# Patient Record
Sex: FEMALE | Race: WHITE | NOT HISPANIC OR LATINO
[De-identification: names, ages, dates, MRNs, and addresses within clinical notes are randomized per-mention and may not be internally consistent; named-entity substitution may affect disease eponyms.]

---

## 2018-12-21 ENCOUNTER — CHART COPY (OUTPATIENT)
Age: 63
End: 2018-12-21

## 2018-12-21 RX ORDER — AMOXICILLIN 500 MG/1
500 CAPSULE ORAL
Qty: 20 | Refills: 2 | Status: ACTIVE | COMMUNITY
Start: 2018-12-21 | End: 1900-01-01

## 2022-08-27 ENCOUNTER — APPOINTMENT (OUTPATIENT)
Dept: ORTHOPEDIC SURGERY | Facility: CLINIC | Age: 67
End: 2022-08-27

## 2022-08-27 VITALS — HEIGHT: 60 IN | WEIGHT: 106 LBS | BODY MASS INDEX: 20.81 KG/M2

## 2022-08-27 DIAGNOSIS — S59.901A UNSPECIFIED INJURY OF RIGHT ELBOW, INITIAL ENCOUNTER: ICD-10-CM

## 2022-08-27 PROCEDURE — A4565: CPT

## 2022-08-27 PROCEDURE — 73110 X-RAY EXAM OF WRIST: CPT | Mod: RT

## 2022-08-27 PROCEDURE — 99203 OFFICE O/P NEW LOW 30 MIN: CPT

## 2022-08-27 PROCEDURE — 73070 X-RAY EXAM OF ELBOW: CPT | Mod: RT

## 2022-08-28 PROBLEM — S59.901A ELBOW INJURY, RIGHT, INITIAL ENCOUNTER: Status: ACTIVE | Noted: 2022-08-28

## 2022-08-28 NOTE — IMAGING
[de-identified] : PE R elbow: skin intact, mild swelling, ROM , limited supination/pronation, NVI distally\par  [Right] : right elbow [No acute displaced fracture or dislocation] : No acute displaced fracture or dislocation

## 2022-08-28 NOTE — HISTORY OF PRESENT ILLNESS
[10] : 10 [Localized] : localized [Constant] : constant [Ice] : ice [Retired] : Work status: retired [de-identified] : 65 y/o RHD F with R elbow injury yesterday s/p fall. Pt wrapped elbow with improvement of pain. Here today for cont pain. denies numbness/tingling.\par  [] : no [FreeTextEntry6] : achy/ pulling  [de-identified] : Movement, bending

## 2022-08-28 NOTE — ASSESSMENT
[FreeTextEntry1] : A/P R elbow injury\par - sling prn\par - NSAIDS\par - can wrap elbow\par - ice/elevation\par - f/u 1-2 weeks

## 2022-09-01 ENCOUNTER — APPOINTMENT (OUTPATIENT)
Dept: ORTHOPEDIC SURGERY | Facility: CLINIC | Age: 67
End: 2022-09-01

## 2022-09-01 VITALS — WEIGHT: 106 LBS | HEIGHT: 60 IN | BODY MASS INDEX: 20.81 KG/M2

## 2022-09-01 DIAGNOSIS — S52.124A NONDISPLACED FRACTURE OF HEAD OF RIGHT RADIUS, INITIAL ENCOUNTER FOR CLOSED FRACTURE: ICD-10-CM

## 2022-09-01 PROCEDURE — 99213 OFFICE O/P EST LOW 20 MIN: CPT

## 2022-09-01 NOTE — HISTORY OF PRESENT ILLNESS
[de-identified] : 9/1/22:  Pt fell in [place] on 8/26/22 on her right elbow.  She has been icing and resting it and is improving.

## 2022-09-01 NOTE — DATA REVIEWED
[Outside X-rays] : outside x-rays [Right] : of the right [Elbow] : elbow [I independently reviewed and interpreted images and report] : I independently reviewed and interpreted images and report [FreeTextEntry1] : No visible fractures